# Patient Record
Sex: FEMALE | Race: WHITE
[De-identification: names, ages, dates, MRNs, and addresses within clinical notes are randomized per-mention and may not be internally consistent; named-entity substitution may affect disease eponyms.]

---

## 2018-06-04 ENCOUNTER — HOSPITAL ENCOUNTER (OUTPATIENT)
Dept: HOSPITAL 75 - RAD | Age: 23
End: 2018-06-04
Attending: OBSTETRICS & GYNECOLOGY
Payer: COMMERCIAL

## 2018-06-04 DIAGNOSIS — N94.10: Primary | ICD-10-CM

## 2018-06-04 PROCEDURE — 76856 US EXAM PELVIC COMPLETE: CPT

## 2018-06-04 NOTE — DIAGNOSTIC IMAGING REPORT
PROCEDURE: US PELVIC (NON OB)



TECHNIQUE: Multiple real-time grayscale images were obtained over

the pelvis in various projections transabdominally.



INDICATION: Dyspareunia.



FINDINGS: The uterus measures 6.3 x 3.6 x 2.8 cm. Endometrial

stripe is 6 mm. There are no myometrial masses. The right ovary

measures 2.7 x 2.2 x 1.5 cm. The left ovary measures 3.2 x 2.0 x

1.8 cm. There is normal blood flow to both ovaries. There are no

adnexal masses. No free fluid.



IMPRESSION: Normal pelvic ultrasound.



Dictated by: 



  Dictated on workstation # [id number]

## 2023-02-18 ENCOUNTER — HOSPITAL ENCOUNTER (OUTPATIENT)
Dept: HOSPITAL 75 - LDRP | Age: 28
End: 2023-02-18
Attending: FAMILY MEDICINE
Payer: SELF-PAY

## 2023-02-18 VITALS — DIASTOLIC BLOOD PRESSURE: 60 MMHG | SYSTOLIC BLOOD PRESSURE: 105 MMHG

## 2023-02-18 VITALS — DIASTOLIC BLOOD PRESSURE: 71 MMHG | SYSTOLIC BLOOD PRESSURE: 117 MMHG

## 2023-02-18 VITALS — BODY MASS INDEX: 27.48 KG/M2 | HEIGHT: 60 IN | WEIGHT: 139.99 LBS

## 2023-02-18 DIAGNOSIS — Z3A.33: ICD-10-CM

## 2023-02-18 DIAGNOSIS — Z34.93: Primary | ICD-10-CM

## 2023-02-18 LAB
APTT PPP: YELLOW S
BACTERIA #/AREA URNS HPF: (no result) /HPF
BILIRUB UR QL STRIP: NEGATIVE
FIBRINOGEN PPP-MCNC: CLEAR MG/DL
GLUCOSE UR STRIP-MCNC: (no result) MG/DL
KETONES UR QL STRIP: NEGATIVE
LEUKOCYTE ESTERASE UR QL STRIP: (no result)
NITRITE UR QL STRIP: NEGATIVE
PH UR STRIP: 6.5 [PH] (ref 5–9)
PROT UR QL STRIP: NEGATIVE
RBC #/AREA URNS HPF: (no result) /HPF
SP GR UR STRIP: 1.02 (ref 1.02–1.02)
WBC #/AREA URNS HPF: (no result) /HPF
YEAST #/AREA URNS HPF: (no result) /HPF

## 2023-02-18 PROCEDURE — 81000 URINALYSIS NONAUTO W/SCOPE: CPT

## 2023-02-18 PROCEDURE — 87088 URINE BACTERIA CULTURE: CPT

## 2023-02-20 ENCOUNTER — HOSPITAL ENCOUNTER (OUTPATIENT)
Dept: HOSPITAL 75 - LDRP | Age: 28
Discharge: ANOTHER HOSPITAL | End: 2023-02-20
Attending: FAMILY MEDICINE
Payer: SELF-PAY

## 2023-02-20 VITALS — WEIGHT: 138.01 LBS | BODY MASS INDEX: 27.45 KG/M2 | HEIGHT: 59.45 IN

## 2023-02-20 VITALS — DIASTOLIC BLOOD PRESSURE: 69 MMHG | SYSTOLIC BLOOD PRESSURE: 109 MMHG

## 2023-02-20 VITALS — SYSTOLIC BLOOD PRESSURE: 105 MMHG | DIASTOLIC BLOOD PRESSURE: 61 MMHG

## 2023-02-20 VITALS — SYSTOLIC BLOOD PRESSURE: 99 MMHG | DIASTOLIC BLOOD PRESSURE: 59 MMHG

## 2023-02-20 VITALS — SYSTOLIC BLOOD PRESSURE: 108 MMHG | DIASTOLIC BLOOD PRESSURE: 66 MMHG

## 2023-02-20 VITALS — DIASTOLIC BLOOD PRESSURE: 65 MMHG | SYSTOLIC BLOOD PRESSURE: 112 MMHG

## 2023-02-20 VITALS — DIASTOLIC BLOOD PRESSURE: 55 MMHG | SYSTOLIC BLOOD PRESSURE: 103 MMHG

## 2023-02-20 DIAGNOSIS — O47.03: Primary | ICD-10-CM

## 2023-02-20 DIAGNOSIS — Z3A.32: ICD-10-CM

## 2023-02-20 LAB
APTT PPP: YELLOW S
BACTERIA #/AREA URNS HPF: NEGATIVE /HPF
BILIRUB UR QL STRIP: NEGATIVE
CAOX CRY #/AREA URNS LPF: (no result) /LPF
FIBRINOGEN PPP-MCNC: CLEAR MG/DL
GLUCOSE UR STRIP-MCNC: NEGATIVE MG/DL
KETONES UR QL STRIP: NEGATIVE
LEUKOCYTE ESTERASE UR QL STRIP: NEGATIVE
NITRITE UR QL STRIP: NEGATIVE
PH UR STRIP: 6.5 [PH] (ref 5–9)
PROT UR QL STRIP: NEGATIVE
RBC #/AREA URNS HPF: (no result) /HPF
SP GR UR STRIP: 1.01 (ref 1.02–1.02)
SQUAMOUS #/AREA URNS HPF: (no result) /HPF
WBC #/AREA URNS HPF: (no result) /HPF

## 2023-02-20 PROCEDURE — 96372 THER/PROPH/DIAG INJ SC/IM: CPT

## 2023-02-20 PROCEDURE — 81000 URINALYSIS NONAUTO W/SCOPE: CPT

## 2023-02-20 PROCEDURE — 99214 OFFICE O/P EST MOD 30 MIN: CPT

## 2023-02-20 PROCEDURE — 96360 HYDRATION IV INFUSION INIT: CPT

## 2023-02-20 PROCEDURE — 87088 URINE BACTERIA CULTURE: CPT

## 2023-02-20 NOTE — SHORT STAY SUMMARY
History of Present Illness


History of Present Illness


Reason for visit/HPI


 at 32w6d was seen in clinic for routine Ob follow up visit, and reported

clear fluid on underwear at times, and contractions. In clinic, sterile speculum

exam was done and there was no pooling, nitrazine testing was negative and no 

ferning was seen on slide of discharge. It did appear consistent with yeast 

infection. Sterile vaginal exam revealed soft, posterior cerivx dilated to 1+

/50%. Of note, per chart review, she was in Labor and Delivery over the weekend 

with a few rare contractions over an hour or so of stay and was dilated to 

2/50%. At clinic, a ultrasound was obtained which showed cervical length 2.7 cm 

(had been 3.3 cm on ) and she was noted to have regular contractions on NST,

so she was sent to [facility] for betamethasone and further monitoring.





She recently transferred Ob care from [place] where she was being monitored with 

serial US cervical lengths until shortly before moving back to [place], and 

per record review, her last US was reassuring and monitoring was discontinued.


Date of Admission


23


Date of Discharge


23


Time Seen by Provider:  10:00


Attending Physician


Keiry Cardozo MD


Admitting Physician


Admitting Physician:


 








Attending Physician:


Keiry Cardozo MD


Consult








Allergies and Home Medications


Allergies


Coded Allergies:  


     No Known Drug Allergies (Unverified , 5/4/15)





Patient Home Medication List


Home Medication List Reviewed:  Yes


No Active Prescriptions or Reported Meds





Past Medical-Social-Family Hx


Patient Social History


Smoking Status:  Never a Smoker


2nd Hand Smoke Exposure:  No





Immunizations Up To Date


Tetanus Booster (TDap):  Unknown


Pediatric:  Yes


Date of Influenza Vaccine:  2023





Surgeries


Yes (D and C)





Respiratory


No





Cardiovascular


No





Neurological


No





Reproductive System


Expected Date of Delivery:  2023


Hx :  3


Hx Para:  1


Hx Total # of Abortions (Spona:  1


Hx Reproductive Disorders:  No


Sexually Transmitted Disease:  No


HIV/AIDS:  No





Blood Transfusions


Adverse Reaction to a Blood Tr:  No





Family Medical History


Family Hx:  


Patient reports no known family medical history.





Review of Systems


Constitutional:  no symptoms reported





Physical Exam


Vital Signs





Vital Signs - First Documented




















Capillary Refill : Less Than 3 Seconds


Height, Weight, BMI


Height: 4'10.25"


Weight: 101lbs. 0.4oz. [id number]; 27.45 BMI


Method:


General Appearance:  No Apparent Distress


Cardiovascular:  No Edema


Genital/Rectal:  Other (thick white vaginal discharge)


Neurologic/Psychiatric:  Alert, Normal Mood/Affect


Skin:  Normal Color, Warm/Dry





Short Stay Diagnosis


Discharge Diagnosis-Short Stay


Admission Diagnosis:  


 contractions


Third trimester pregnancy


32 weeks gestation


History of  delivery


Suspected vaginal yeast infection


Final Discharge Diagnosis:  


Same as admit plus


 labor





Conclusion


Labs


Laboratory Tests


23 12:03: 


Urine Color YELLOW, Urine Clarity CLEAR, Urine pH 6.5, Urine Specific Gravity 

1.015L, Urine Protein NEGATIVE, Urine Glucose (UA) NEGATIVE, Urine Ketones 

NEGATIVE, Urine Nitrite NEGATIVE, Urine Bilirubin NEGATIVE, Urine Urobilinogen 

1.0, Urine Leukocyte Esterase NEGATIVE, Urine RBC (Auto) NEGATIVE, Urine RBC 

RARE, Urine WBC RARE, Urine Squamous Epithelial Cells RARE, Urine Crystals 

PRESENTH, Urine Calcium Oxalate Crystals MODERATEH, Urine Bacteria NEGATIVE, 

Urine Casts NONE, Urine Mucus SMALLH, Urine Culture Indicated NO


Conclusion/Plan


Pt observed and noted to have regular contractions and cervical change (from 2 

to 3 cm) consistent with  labor. She had a dose of betamethasone and 

nifedipine and was transferred to [place] due to NICU availability.











KEIRY CARDOZO MD             2023 19:21

## 2023-02-27 ENCOUNTER — HOSPITAL ENCOUNTER (INPATIENT)
Dept: HOSPITAL 75 - WSO | Age: 28
LOS: 1 days | Discharge: HOME | End: 2023-02-28
Attending: FAMILY MEDICINE | Admitting: FAMILY MEDICINE
Payer: SELF-PAY

## 2023-02-27 VITALS — DIASTOLIC BLOOD PRESSURE: 68 MMHG | SYSTOLIC BLOOD PRESSURE: 122 MMHG

## 2023-02-27 VITALS — DIASTOLIC BLOOD PRESSURE: 55 MMHG | SYSTOLIC BLOOD PRESSURE: 111 MMHG

## 2023-02-27 VITALS — DIASTOLIC BLOOD PRESSURE: 59 MMHG | SYSTOLIC BLOOD PRESSURE: 99 MMHG

## 2023-02-27 VITALS — SYSTOLIC BLOOD PRESSURE: 112 MMHG | DIASTOLIC BLOOD PRESSURE: 73 MMHG

## 2023-02-27 VITALS — SYSTOLIC BLOOD PRESSURE: 108 MMHG | DIASTOLIC BLOOD PRESSURE: 59 MMHG

## 2023-02-27 VITALS — SYSTOLIC BLOOD PRESSURE: 126 MMHG | DIASTOLIC BLOOD PRESSURE: 52 MMHG

## 2023-02-27 VITALS — DIASTOLIC BLOOD PRESSURE: 56 MMHG | SYSTOLIC BLOOD PRESSURE: 114 MMHG

## 2023-02-27 VITALS — SYSTOLIC BLOOD PRESSURE: 107 MMHG | DIASTOLIC BLOOD PRESSURE: 59 MMHG

## 2023-02-27 VITALS — SYSTOLIC BLOOD PRESSURE: 108 MMHG | DIASTOLIC BLOOD PRESSURE: 61 MMHG

## 2023-02-27 VITALS — SYSTOLIC BLOOD PRESSURE: 112 MMHG | DIASTOLIC BLOOD PRESSURE: 59 MMHG

## 2023-02-27 VITALS — SYSTOLIC BLOOD PRESSURE: 112 MMHG | DIASTOLIC BLOOD PRESSURE: 72 MMHG

## 2023-02-27 VITALS — DIASTOLIC BLOOD PRESSURE: 73 MMHG | SYSTOLIC BLOOD PRESSURE: 113 MMHG

## 2023-02-27 VITALS — DIASTOLIC BLOOD PRESSURE: 58 MMHG | SYSTOLIC BLOOD PRESSURE: 126 MMHG

## 2023-02-27 VITALS — DIASTOLIC BLOOD PRESSURE: 72 MMHG | SYSTOLIC BLOOD PRESSURE: 119 MMHG

## 2023-02-27 VITALS — SYSTOLIC BLOOD PRESSURE: 109 MMHG | DIASTOLIC BLOOD PRESSURE: 60 MMHG

## 2023-02-27 VITALS — WEIGHT: 137.13 LBS | HEIGHT: 59.06 IN | BODY MASS INDEX: 27.64 KG/M2

## 2023-02-27 VITALS — DIASTOLIC BLOOD PRESSURE: 56 MMHG | SYSTOLIC BLOOD PRESSURE: 110 MMHG

## 2023-02-27 DIAGNOSIS — Z3A.33: ICD-10-CM

## 2023-02-27 DIAGNOSIS — O41.1230: ICD-10-CM

## 2023-02-27 DIAGNOSIS — D72.829: ICD-10-CM

## 2023-02-27 LAB
APTT PPP: (no result) S
BACTERIA #/AREA URNS HPF: (no result) /HPF
BASOPHILS # BLD AUTO: 0 10^3/UL (ref 0–0.1)
BASOPHILS NFR BLD AUTO: 0 % (ref 0–10)
BILIRUB UR QL STRIP: NEGATIVE
EOSINOPHIL # BLD AUTO: 0.1 10^3/UL (ref 0–0.3)
EOSINOPHIL NFR BLD AUTO: 0 % (ref 0–10)
FIBRINOGEN PPP-MCNC: (no result) MG/DL
GLUCOSE UR STRIP-MCNC: NEGATIVE MG/DL
HCT VFR BLD CALC: 38 % (ref 35–52)
HGB BLD-MCNC: 13 G/DL (ref 11.5–16)
KETONES UR QL STRIP: NEGATIVE
LEUKOCYTE ESTERASE UR QL STRIP: (no result)
LYMPHOCYTES # BLD AUTO: 2.1 10^3/UL (ref 1–4)
LYMPHOCYTES NFR BLD AUTO: 13 % (ref 12–44)
MANUAL DIFFERENTIAL PERFORMED BLD QL: YES
MCH RBC QN AUTO: 29 PG (ref 25–34)
MCHC RBC AUTO-ENTMCNC: 34 G/DL (ref 32–36)
MCV RBC AUTO: 87 FL (ref 80–99)
MONOCYTES # BLD AUTO: 2 10^3/UL (ref 0–1)
MONOCYTES NFR BLD AUTO: 12 % (ref 0–12)
MONOCYTES NFR BLD: 5 %
NEUTROPHILS # BLD AUTO: 12.3 10^3/UL (ref 1.8–7.8)
NEUTROPHILS NFR BLD AUTO: 74 % (ref 42–75)
NEUTS BAND NFR BLD MANUAL: 73 %
NEUTS BAND NFR BLD: 5 %
NITRITE UR QL STRIP: NEGATIVE
PH UR STRIP: 7 [PH] (ref 5–9)
PLATELET # BLD: 262 10^3/UL (ref 130–400)
PMV BLD AUTO: 10.4 FL (ref 9–12.2)
PROT UR QL STRIP: NEGATIVE
RBC #/AREA URNS HPF: (no result) /HPF
SP GR UR STRIP: 1.01 (ref 1.02–1.02)
SQUAMOUS #/AREA URNS HPF: (no result) /HPF
VARIANT LYMPHS NFR BLD MANUAL: 10 %
VARIANT LYMPHS NFR BLD MANUAL: 7 %
WBC # BLD AUTO: 16.7 10^3/UL (ref 4.3–11)

## 2023-02-27 PROCEDURE — 85025 COMPLETE CBC W/AUTO DIFF WBC: CPT

## 2023-02-27 PROCEDURE — 81000 URINALYSIS NONAUTO W/SCOPE: CPT

## 2023-02-27 PROCEDURE — 85027 COMPLETE CBC AUTOMATED: CPT

## 2023-02-27 PROCEDURE — 86901 BLOOD TYPING SEROLOGIC RH(D): CPT

## 2023-02-27 PROCEDURE — 86900 BLOOD TYPING SEROLOGIC ABO: CPT

## 2023-02-27 PROCEDURE — 85007 BL SMEAR W/DIFF WBC COUNT: CPT

## 2023-02-27 PROCEDURE — 87088 URINE BACTERIA CULTURE: CPT

## 2023-02-27 PROCEDURE — 86780 TREPONEMA PALLIDUM: CPT

## 2023-02-27 PROCEDURE — 86850 RBC ANTIBODY SCREEN: CPT

## 2023-02-27 PROCEDURE — 36415 COLL VENOUS BLD VENIPUNCTURE: CPT

## 2023-02-27 RX ADMIN — WATER SCH MLS/HR: 1 INJECTION INTRAMUSCULAR; INTRAVENOUS; SUBCUTANEOUS at 21:37

## 2023-02-27 RX ADMIN — Medication SCH MLS/HR: at 16:20

## 2023-02-27 RX ADMIN — DOCUSATE SODIUM SCH MG: 100 CAPSULE ORAL at 21:36

## 2023-02-27 RX ADMIN — IBUPROFEN SCH MG: 600 TABLET ORAL at 17:28

## 2023-02-27 RX ADMIN — WATER SCH MLS/HR: 1 INJECTION INTRAMUSCULAR; INTRAVENOUS; SUBCUTANEOUS at 16:04

## 2023-02-27 RX ADMIN — Medication SCH MLS/HR: at 17:50

## 2023-02-27 NOTE — HISTORY & PHYSICAL-OB
OB - Chief Complaint & HPI


Date/Time


Date of Admission:


Date of Admission:  2023 at 11:41


Date seen by a Provider:  2023


Time Seen by a Provider:  12:20





Chief Complaint/History


OB-Reason for Admission/Chief:   Labor


Hx :  3


Hx Para:  0111


Expected Date of Delivery:  2023


Gestational Age in Weeks:  33


Gestational Age in Days:  6


History of Labs


O+, antibody neg. RI. HIV/HepB/RPR NR. 1 hour glucola nml.


Varicella non-immune. CF, SMA and hemoglobinopathy carrier testing negative. TB 

quantiferon negative. Cell free fetal DNA low risk, female.


Other


 at 33w6d presents with contractions since around midnight. She was here 

last week with  contractions and cervical change to 4 cm, and received 

betamethasone and was transferred to [place] where her contractions stopped, and

she believes she was 4 cm/80%. She reports having contractions yesterday and 

going to [place], reports she was 4 cm/90% but her contractions spaced out and 

she was discharged. Last night they became more strong and this morning 

continued to get closer up until current at every 5 minutes. She has had some 

bleeding after her check, denies leaking fluid. She did deliver her first baby 

at 36w3d and had PTL at 33 weeks in that pregnancy.





Allergies and Home Medications


Allergies


Coded Allergies:  


     No Known Drug Allergies (Unverified , 5/4/15)





Patient Home Medication List


Home Medication List Reviewed:  Yes


Prenatal Vit/Iron Fumarate/FA (Prenatal Tablet) 27 Mg Iron-800 Mcg Tablet, 1 

EACH PO DAILY, (Reported)


   Entered as Reported by: CELIA UMAÑA on 23 1319


   Last Action: New Order





OB - History


Hx of Present Pregnancy


Prenatal Care:  Yes


Ultrasounds:  Normal mid trimester US


Other Pregnancy Concerns:


 labor





Prenatal Information


Pregnancy Induced Hypertension:  No


Maternal Gestational Diabetes:  No


Postpartum Hemorrhage:  No





Obstetrical History


Hx :  3


Hx Para:  1


Hx # Term Pregnancies:  0


Hx #  Pregnancies:  1


Number of Living Children:  1


Hx Pregnancy Termination:  No


Hx Total # of Abortions (Spona:  1


Hx Multiple Gestation:  No


Hx Ectopic Pregnancy:  No


Hx Stillbirth:  No


Hx Pregnancy Complication:  Yes


Hx Pregnancy Induced Hypertens:  No


Hx Maternal Gestational Diabet:  Yes (suspected in first pregnancy with 1 hour 

glucola 180 and 3 hour not complet)


Hx Postpartum Hemorrhage:  No (did require transfusion with miscarriage)





Delivery History


Hx Dystocia:  No


Hx Forceps Assisted Delivery:  No


Hx Vacuum Extraction Assisted:  No


Hx Placenta Abnormality:  No


Hx Fetal Distress:  No


Hx Large For Gestational Age I:  No


Hx Small for Gestational Age I:  No


Hx  Section:  No


Hx Vaginal Delivery Post C-Sec:  No


Hx Blood Disorders:  No


Adverse Rxn to Tranfusion:  No





Patient Past Medical History





PMH:


Miscarriage


Nephrolithiasis





SurgHx:


D and C





Social History/Family History


Alcohol Use:  Denies Use


Recreational Drug Use:  No


Smoking Cessation:  Never smoker


2nd Hand Smoke Exposure:  No





Immunizations


Influenza Vaccine Up-to-Date:  No; Not Current


Hepatitis A:  No


Hepatitis B:  No


Tetanus Booster (TDap):  Unknown


Rubella:  immune


RPR/VDRL:  Negative


GBS Status:  Unknown


HBsAG:  Negative





OB - Admission Exam


Physical Exam


Vitals:





Vital Signs








 23





 11:18 12:30


 


Temp 36.8 


 


Pulse  99


 


Resp  18


 


B/P (MAP) 112/72 


 


Pulse Ox  98


 


O2 Delivery Room Air 








HEENT:  NCAT


Abdomen:  Gravid


Extremities:  Normal


Cervical Dilatation:  6cm


Effacement:  Other (90)


Station:  -3


Membranes:  Intact


Accelerations:  Accelerations Present


Decelerations:  No Decelerations


Short Term Variability:  Present


Long Term Variability:  Average (6-25)


Contractions on Admission:  < 5 Minutes Apart


Labs





Laboratory Tests








Test


 23


11:00 23


11:35 Range/Units


 


 


Urine Color DARK YELLOW    


 


Urine Clarity SL CLOUDY    


 


Urine pH 7.0   5-9  


 


Urine Specific Gravity 1.010 L  1.016-1.022  


 


Urine Protein NEGATIVE   NEGATIVE  


 


Urine Glucose (UA) NEGATIVE   NEGATIVE  


 


Urine Ketones NEGATIVE   NEGATIVE  


 


Urine Nitrite NEGATIVE   NEGATIVE  


 


Urine Bilirubin NEGATIVE   NEGATIVE  


 


Urine Urobilinogen 1.0   < = 1.0  MG/DL


 


Urine Leukocyte Esterase 2+ H  NEGATIVE  


 


Urine RBC (Auto) 1+ H  NEGATIVE  


 


Urine RBC 0-2    /HPF


 


Urine WBC 10-25 H   /HPF


 


Urine Squamous Epithelial


Cells 0-2 


 


  /HPF





 


Urine Crystals NONE    /LPF


 


Urine Bacteria TRACE    /HPF


 


Urine Casts NONE    /LPF


 


Urine Mucus NEGATIVE    /LPF


 


Urine Culture Indicated YES    


 


White Blood Count


 


 16.7 H


 4.3-11.0


10^3/uL


 


Red Blood Count


 


 4.42 


 3.80-5.11


10^6/uL


 


Hemoglobin  13.0  11.5-16.0  g/dL


 


Hematocrit  38  35-52  %


 


Mean Corpuscular Volume  87  80-99  fL


 


Mean Corpuscular Hemoglobin  29  25-34  pg


 


Mean Corpuscular Hemoglobin


Concent 


 34 


 32-36  g/dL





 


Red Cell Distribution Width  13.4  10.0-14.5  %


 


Platelet Count


 


 262 


 130-400


10^3/uL


 


Mean Platelet Volume  10.4  9.0-12.2  fL


 


Immature Granulocyte % (Auto)  1   %


 


Neutrophils (%) (Auto)  74  42-75  %


 


Lymphocytes (%) (Auto)  13  12-44  %


 


Monocytes (%) (Auto)  12  0-12  %


 


Eosinophils (%) (Auto)  0  0-10  %


 


Basophils (%) (Auto)  0  0-10  %


 


Neutrophils # (Auto)


 


 12.3 H


 1.8-7.8


10^3/uL


 


Lymphocytes # (Auto)


 


 2.1 


 1.0-4.0


10^3/uL


 


Monocytes # (Auto)


 


 2.0 H


 0.0-1.0


10^3/uL


 


Eosinophils # (Auto)


 


 0.1 


 0.0-0.3


10^3/uL


 


Basophils # (Auto)


 


 0.0 


 0.0-0.1


10^3/uL


 


Immature Granulocyte # (Auto)


 


 0.2 H


 0.0-0.1


10^3/uL


 


Neutrophils % (Manual)  73   %


 


Lymphocytes % (Manual)  7   %


 


Monocytes % (Manual)  5   %


 


Band Neutrophils  5   %


 


Reactive Lymphocytes  10   %











OB - Assessment/Plan/Diagnosis


Assessment


Admission Dx


 labor at 33w6d


GBS unknown


Admission Status:  Inpatient Order (span 2 midnights)


Reason for Inpatient Admission:  


Labor, delivery and postparutm course





Plan


Problems:  


(1)  labor


Assessment & Plan:  33w6d with accurate dating. Since arrival changed in less 

than an hour from 5-6+ cm, does not appear safe to transfer due to active labor.

She has received betamethasone one week ago. Start ampicillin for GBS unknown. 

Expectant management. Discussed with patient the risks of delivery during 

transport vs delivery here with expected transfer of  to NICU after 

delivery and she is agreeable with plan. Discussed with Pediatrician on call as 

well.


Qualifiers:  


   











KEIRY BLACKWELL MD             2023 13:51

## 2023-02-27 NOTE — OB LABOR & DELIVERY RECORD
Vag Delivery Note


Vag Delivery Note


Date of Delivery: 23 





Preoperative Diagnosis: Adilene Lozano  is a (27  /Para  3 / 1,

Gestational Age (wks)33with 6 days





Postoperative Diagnosis: Same


Surgeon: KEIRY BLACKWELL 





Anesthesia: None





Delivery Type: 





Findings: 


Viable female infant, apgars 6/9, weight 4#11


Lacerations: none


Intact placenta with 3 vessel cord. No nuchal cord, body cord or shoulder 

dystocia





Estimated Blood Loss: 100 ml





Complications: None





Condition: Stable





Description of Procedure:





The patient is a 27 year old female who presented in active  labor with 

chorioamnionitis. She was admitted and informed consent was obtained. Her labor 

course was remarkable for  labor, fever and fetal tachycardia. She 

progressed to complete dilatation and began to push. 





She was then set up for delivery. The infant's head was delivered atraumatically

in the KATHLEEN position. The shoulders and remainder of the infant's body were then 

delivered without difficulty. Upon delivery, the infant was vigorous and placed 

on maternal chest and the mouth and nares were bulb suctioned. After a brief 

delay cord was doubly clamped and cut and the infant was handed off to the 

nursery staff. An intact placenta that appeared bi-lobed with 3-vessel cord 

delivered via Aleksey and there was found to be minimal bleeding.~ Vigorous 

fundal massage was performed and the fundus was found to be firm. IV oxytocin 

was given. Examination of the vagina and perineum revealed no lacerations. 

Following the delivery, sponge, instrument and needle counts were correct. Mom 

and baby were both in stable condition in the labor suite.





Vitals - Labs


Vital Signs - I&O





Vital Signs








  Date Time  Temp Pulse Resp B/P (MAP) Pulse Ox O2 Delivery O2 Flow Rate FiO2


 


23 14:00 37.9 105 20 112/73 (86)    


 


23 13:30  104 20 113/73 (86)    


 


23 12:30  99 18  98   


 


23 11:30  105 18  98   


 


23 11:18 36.8 114 18 112/72 98 Room Air  


 


23 11:00 36.8 114 18  99 Room Air  











Labs


Laboratory Tests


23 11:00: 


Urine Color DARK YELLOW, Urine Clarity SL CLOUDY, Urine pH 7.0, Urine Specific 

Gravity 1.010L, Urine Protein NEGATIVE, Urine Glucose (UA) NEGATIVE, Urine 

Ketones NEGATIVE, Urine Nitrite NEGATIVE, Urine Bilirubin NEGATIVE, Urine 

Urobilinogen 1.0, Urine Leukocyte Esterase 2+H, Urine RBC (Auto) 1+H, Urine RBC 

0-2, Urine WBC 10-25H, Urine Squamous Epithelial Cells 0-2, Urine Crystals NONE,

Urine Bacteria TRACE, Urine Casts NONE, Urine Mucus NEGATIVE, Urine Culture 

Indicated YES


23 11:35: 


White Blood Count 16.7H, Red Blood Count 4.42, Hemoglobin 13.0, Hematocrit 38, 

Mean Corpuscular Volume 87, Mean Corpuscular Hemoglobin 29, Mean Corpuscular 

Hemoglobin Concent 34, Red Cell Distribution Width 13.4, Platelet Count 262, 

Mean Platelet Volume 10.4, Immature Granulocyte % (Auto) 1, Neutrophils (%) 

(Auto) 74, Lymphocytes (%) (Auto) 13, Monocytes (%) (Auto) 12, Eosinophils (%) 

(Auto) 0, Basophils (%) (Auto) 0, Neutrophils # (Auto) 12.3H, Lymphocytes # 

(Auto) 2.1, Monocytes # (Auto) 2.0H, Eosinophils # (Auto) 0.1, Basophils # 

(Auto) 0.0, Immature Granulocyte # (Auto) 0.2H, Neutrophils % (Manual) 73, 

Lymphocytes % (Manual) 7, Monocytes % (Manual) 5, Band Neutrophils 5, Reactive 

Lymphocytes 10











KEIRY BLACKWELL MD             2023 17:03

## 2023-02-28 VITALS — DIASTOLIC BLOOD PRESSURE: 65 MMHG | SYSTOLIC BLOOD PRESSURE: 99 MMHG

## 2023-02-28 VITALS — SYSTOLIC BLOOD PRESSURE: 100 MMHG | DIASTOLIC BLOOD PRESSURE: 59 MMHG

## 2023-02-28 VITALS — SYSTOLIC BLOOD PRESSURE: 100 MMHG | DIASTOLIC BLOOD PRESSURE: 60 MMHG

## 2023-02-28 VITALS — DIASTOLIC BLOOD PRESSURE: 63 MMHG | SYSTOLIC BLOOD PRESSURE: 99 MMHG

## 2023-02-28 VITALS — SYSTOLIC BLOOD PRESSURE: 100 MMHG | DIASTOLIC BLOOD PRESSURE: 68 MMHG

## 2023-02-28 LAB
BASOPHILS # BLD AUTO: 0.1 10^3/UL (ref 0–0.1)
BASOPHILS NFR BLD AUTO: 0 % (ref 0–10)
EOSINOPHIL # BLD AUTO: 0.2 10^3/UL (ref 0–0.3)
EOSINOPHIL NFR BLD AUTO: 1 % (ref 0–10)
HCT VFR BLD CALC: 35 % (ref 35–52)
HGB BLD-MCNC: 11.8 G/DL (ref 11.5–16)
LYMPHOCYTES # BLD AUTO: 2.6 10^3/UL (ref 1–4)
LYMPHOCYTES NFR BLD AUTO: 18 % (ref 12–44)
MANUAL DIFFERENTIAL PERFORMED BLD QL: NO
MCH RBC QN AUTO: 30 PG (ref 25–34)
MCHC RBC AUTO-ENTMCNC: 34 G/DL (ref 32–36)
MCV RBC AUTO: 88 FL (ref 80–99)
MONOCYTES # BLD AUTO: 1.8 10^3/UL (ref 0–1)
MONOCYTES NFR BLD AUTO: 12 % (ref 0–12)
NEUTROPHILS # BLD AUTO: 10.2 10^3/UL (ref 1.8–7.8)
NEUTROPHILS NFR BLD AUTO: 68 % (ref 42–75)
PLATELET # BLD: 244 10^3/UL (ref 130–400)
PMV BLD AUTO: 10 FL (ref 9–12.2)
WBC # BLD AUTO: 15 10^3/UL (ref 4.3–11)

## 2023-02-28 RX ADMIN — IBUPROFEN SCH MG: 600 TABLET ORAL at 13:41

## 2023-02-28 RX ADMIN — IBUPROFEN SCH MG: 600 TABLET ORAL at 00:41

## 2023-02-28 RX ADMIN — DOCUSATE SODIUM SCH MG: 100 CAPSULE ORAL at 09:02

## 2023-02-28 RX ADMIN — IBUPROFEN SCH MG: 600 TABLET ORAL at 06:50

## 2023-02-28 RX ADMIN — WATER SCH MLS/HR: 1 INJECTION INTRAMUSCULAR; INTRAVENOUS; SUBCUTANEOUS at 04:59

## 2023-02-28 RX ADMIN — WATER SCH MLS/HR: 1 INJECTION INTRAMUSCULAR; INTRAVENOUS; SUBCUTANEOUS at 00:41

## 2023-02-28 NOTE — DISCHARGE SUMMARY
Discharge Summary


Hospital Course


Hospital Course


Date of Admission: 2023 at 11:41 


Admission Diagnosis :  


 labor at 33w6d





Family Physician/Provider: Keiry Cardozo MD  





Date of Discharge: 23 


Discharge Diagnosis: 


Spontaneous vaginal delivery


Chorioamnionitis resolved








Hospital Course:


G3 now P2 admitted in active labor at 33w6d, shortly after admission became 

febrile, treated with amp and gent for chorioamnionitis, delivered via vaginal 

delivery viable female infant. No lacerations. Postpartum remained afebrile and 

had improved leukocytosis, asymptomatic.














Labs and Pending Lab Test:


Laboratory Tests


23 05:20: 


White Blood Count 15.0H, Red Blood Count 4.00, Hemoglobin 11.8, Hematocrit 35, 

Mean Corpuscular Volume 88, Mean Corpuscular Hemoglobin 30, Mean Corpuscular 

Hemoglobin Concent 34, Red Cell Distribution Width 13.2, Platelet Count 244, 

Mean Platelet Volume 10.0, Immature Granulocyte % (Auto) 1, Neutrophils (%) 

(Auto) 68, Lymphocytes (%) (Auto) 18, Monocytes (%) (Auto) 12, Eosinophils (%) 

(Auto) 1, Basophils (%) (Auto) 0, Neutrophils # (Auto) 10.2H, Lymphocytes # 

(Auto) 2.6, Monocytes # (Auto) 1.8H, Eosinophils # (Auto) 0.2, Basophils # 

(Auto) 0.1, Immature Granulocyte # (Auto) 0.2H





Microbiology


23 Urine Culture - Final, Complete


          Gram Pos Mixed Bacterial Dixie





Home Meds


Active


Ibu (Ibuprofen) 600 Mg Tablet 600 Mg PO Q6HR PRN


Reported


Prenatal Tablet (Prenatal Vit/Iron Fumarate/FA) 27 Mg Iron-800 Mcg Tablet 1 Each

PO DAILY


Assessment/Pt DC Instructions


Follow up with Dr. Cardozo in 6 weeks.


Discharge Diet:  No Restrictions


Activity as Tolerated:  Yes (avoid strenuous activity for 6 weeks)





Discharge Physical Examination


Allergies:  


Coded Allergies:  


     No Known Drug Allergies (Unverified , 5/4/15)


General Appearance:  No Apparent Distress, WD/WN


Respiratory:  Lungs Clear, Normal Breath Sounds


Cardiovascular:  Regular Rate, Rhythm, No Murmur


Gastrointestinal:  Non Tender (fundus nontender)


Extremity:  No Pedal Edema


Skin:  Normal Color, Warm/Dry


Neurologic/Psychiatric:  Alert, Normal Mood/Affect











KEIRY CARDOZO MD             2023 16:04